# Patient Record
Sex: MALE | URBAN - METROPOLITAN AREA
[De-identification: names, ages, dates, MRNs, and addresses within clinical notes are randomized per-mention and may not be internally consistent; named-entity substitution may affect disease eponyms.]

---

## 2023-02-07 ENCOUNTER — EMERGENCY (EMERGENCY)
Facility: HOSPITAL | Age: 33
LOS: 1 days | Discharge: HOME | End: 2023-02-07
Attending: EMERGENCY MEDICINE
Payer: SELF-PAY

## 2023-02-07 VITALS
DIASTOLIC BLOOD PRESSURE: 65 MMHG | HEART RATE: 87 BPM | RESPIRATION RATE: 18 BRPM | TEMPERATURE: 98 F | OXYGEN SATURATION: 99 % | SYSTOLIC BLOOD PRESSURE: 98 MMHG

## 2023-02-07 VITALS
SYSTOLIC BLOOD PRESSURE: 115 MMHG | DIASTOLIC BLOOD PRESSURE: 78 MMHG | RESPIRATION RATE: 18 BRPM | HEART RATE: 86 BPM | HEIGHT: 65 IN | TEMPERATURE: 98 F | WEIGHT: 160.06 LBS | OXYGEN SATURATION: 98 %

## 2023-02-07 PROCEDURE — 82962 GLUCOSE BLOOD TEST: CPT

## 2023-02-07 PROCEDURE — 99285 EMERGENCY DEPT VISIT HI MDM: CPT

## 2023-02-07 PROCEDURE — 99284 EMERGENCY DEPT VISIT MOD MDM: CPT

## 2023-02-07 NOTE — ED PROVIDER NOTE - PATIENT PORTAL LINK FT
You can access the FollowMyHealth Patient Portal offered by [facility] by registering at the following website: [email or web address]. By joining [facility]’s FollowMyHealth portal, you will also be able to view your health information using other applications (apps) compatible with our system.

## 2023-02-07 NOTE — ED PROVIDER NOTE - PHYSICAL EXAMINATION
GENERAL: well appearing, no acute distress   HEAD: atraumatic   EYES: EOMI   ENT: moist oral mucosa   CARDIAC: regular rate  RESPIRATORY: no increased work of breathing   ABDO: soft   MUSCULOSKELETAL: no deformity   NEUROLOGICAL: alert, protecting airway, spontaneous movement of extremities   SKIN: no visible rash  PSYCHIATRIC: cooperative

## 2023-02-07 NOTE — ED PROVIDER NOTE - CLINICAL SUMMARY MEDICAL DECISION MAKING FREE TEXT BOX
32-year-old [place]  Suspect intoxication.  No external signs of trauma.  Vital signs within normal limits.  Plan for fingerstick and observe and reassess for clinical sobriety

## 2023-02-07 NOTE — ED PROVIDER NOTE - PROGRESS NOTE DETAILS
Patient now more awake and alert.  States that he lives with friends in [place].  States he was drinking a lot of alcohol earlier.  Denies medical history or taking meds at home.  However patient still too intoxicated and not ready for discharge.  Signed out to night team to follow-up reassessment for clinical sobriety Patient now more awake and alert.  States that he lives with friends in [place].  States he was drinking a lot of alcohol earlier.  Denies medical history or taking meds at home.  However patient still too intoxicated and not ready for discharge. Patient clinically sober, steady on feet, with clear speech.  Ambulated to bathroom  Dc

## 2023-02-07 NOTE — ED PROVIDER NOTE - OBJECTIVE STATEMENT
32-year-old male unknown past medical history [place]er called for likely intoxication.  Patient noted to have soiled his clothing.  Patient cannot provide a meaningful history on arrival.

## 2023-02-08 NOTE — CHART NOTE - NSCHARTNOTEFT_GEN_A_CORE
Pt is a 32year-old Japanese speaking male who was brought to the ED by EMS from the street for altered mental status evaluation. Pt seen at bedside re consult. He appeared alert and oriented x3. Shamir introduced self, role and purpose of visit explained. Pt participated in sbirt screening . Pt admitted to excessive alcohol intake but declined active referral to treatment. Emotional support, psychoeducation re alcohol and SA resources provided. Pt denied illicit drug use. Sbirt screen completed in sunrise. Pt was socially cleared and Physician updated. , Shravan ID # [id number] utilized.

## 2023-02-08 NOTE — SBIRT NOTE ADULT - NSSBIRTBRIEFINTDET_GEN_A_CORE
Pt admitted to excessive alcohol intake but declined active referral to treatment. Emotional support, psychoeducation re alcohol and SA resources provided